# Patient Record
Sex: FEMALE | Race: ASIAN | Employment: UNEMPLOYED | URBAN - METROPOLITAN AREA
[De-identification: names, ages, dates, MRNs, and addresses within clinical notes are randomized per-mention and may not be internally consistent; named-entity substitution may affect disease eponyms.]

---

## 2024-05-11 ENCOUNTER — HOSPITAL ENCOUNTER (EMERGENCY)
Facility: HOSPITAL | Age: 44
Discharge: HOME | End: 2024-05-11
Attending: EMERGENCY MEDICINE

## 2024-05-11 ENCOUNTER — APPOINTMENT (OUTPATIENT)
Dept: ULTRASOUND IMAGING | Facility: HOSPITAL | Age: 44
End: 2024-05-11
Attending: EMERGENCY MEDICINE

## 2024-05-11 VITALS
BODY MASS INDEX: 21.09 KG/M2 | WEIGHT: 119.06 LBS | SYSTOLIC BLOOD PRESSURE: 107 MMHG | RESPIRATION RATE: 18 BRPM | HEART RATE: 67 BPM | HEIGHT: 62.99 IN | OXYGEN SATURATION: 100 % | TEMPERATURE: 99 F | DIASTOLIC BLOOD PRESSURE: 72 MMHG

## 2024-05-11 DIAGNOSIS — O02.0 MOLAR PREGNANCY (HCC): Primary | ICD-10-CM

## 2024-05-11 LAB
B-HCG SERPL-ACNC: ABNORMAL MIU/ML
BILIRUB UR QL STRIP.AUTO: NEGATIVE
CLARITY UR REFRACT.AUTO: CLEAR
COLOR UR AUTO: COLORLESS
ERYTHROCYTE [DISTWIDTH] IN BLOOD BY AUTOMATED COUNT: 12 %
GLUCOSE UR STRIP.AUTO-MCNC: NORMAL MG/DL
HCT VFR BLD AUTO: 38.5 %
HGB BLD-MCNC: 12.9 G/DL
KETONES UR STRIP.AUTO-MCNC: NEGATIVE MG/DL
LEUKOCYTE ESTERASE UR QL STRIP.AUTO: NEGATIVE
MCH RBC QN AUTO: 30.9 PG (ref 26–34)
MCHC RBC AUTO-ENTMCNC: 33.5 G/DL (ref 31–37)
MCV RBC AUTO: 92.3 FL
NITRITE UR QL STRIP.AUTO: NEGATIVE
PH UR STRIP.AUTO: 5.5 [PH] (ref 5–8)
PLATELET # BLD AUTO: 211 10(3)UL (ref 150–450)
PROT UR STRIP.AUTO-MCNC: NEGATIVE MG/DL
RBC # BLD AUTO: 4.17 X10(6)UL
RBC UR QL AUTO: NEGATIVE
RH BLOOD TYPE: POSITIVE
SP GR UR STRIP.AUTO: 1.01 (ref 1–1.03)
UROBILINOGEN UR STRIP.AUTO-MCNC: NORMAL MG/DL
WBC # BLD AUTO: 6 X10(3) UL (ref 4–11)

## 2024-05-11 PROCEDURE — 99284 EMERGENCY DEPT VISIT MOD MDM: CPT

## 2024-05-11 PROCEDURE — 85027 COMPLETE CBC AUTOMATED: CPT | Performed by: EMERGENCY MEDICINE

## 2024-05-11 PROCEDURE — 76817 TRANSVAGINAL US OBSTETRIC: CPT | Performed by: EMERGENCY MEDICINE

## 2024-05-11 PROCEDURE — 84702 CHORIONIC GONADOTROPIN TEST: CPT | Performed by: EMERGENCY MEDICINE

## 2024-05-11 PROCEDURE — 81003 URINALYSIS AUTO W/O SCOPE: CPT | Performed by: EMERGENCY MEDICINE

## 2024-05-11 PROCEDURE — 36415 COLL VENOUS BLD VENIPUNCTURE: CPT

## 2024-05-11 PROCEDURE — 81025 URINE PREGNANCY TEST: CPT

## 2024-05-11 PROCEDURE — 99285 EMERGENCY DEPT VISIT HI MDM: CPT

## 2024-05-11 PROCEDURE — 76801 OB US < 14 WKS SINGLE FETUS: CPT | Performed by: EMERGENCY MEDICINE

## 2024-05-11 PROCEDURE — 86901 BLOOD TYPING SEROLOGIC RH(D): CPT | Performed by: EMERGENCY MEDICINE

## 2024-05-11 PROCEDURE — 86900 BLOOD TYPING SEROLOGIC ABO: CPT | Performed by: EMERGENCY MEDICINE

## 2024-05-11 NOTE — ED QUICK NOTES
Pt remains resting on cart with family at bs - denies pain/vaginal dischg. Pt received urine cup for spec after US completed.

## 2024-05-11 NOTE — ED PROVIDER NOTES
Patient Seen in: [facility] Emergency Department      History     Chief Complaint   Patient presents with    Pregnancy Issues     Stated Complaint: sent from [facility] for US, LMP 3/22/24, 7 weeks pregnant    Subjective:   HPI    43-year-old female  6 para 4-0-1-4 last menstrual period 3/22/2024 estimated gestational age of 7 weeks presents to the emergency department from a clinic in [place] for evaluation of pregnancy status to rule out ectopic.  The patient denies any abdominal pain or vaginal bleeding.  She has been nauseated consistent with prior pregnancies.  An ultrasound today reportedly could not locate the pregnancy in the uterus.    Objective:   History reviewed. No pertinent past medical history.           History reviewed. No pertinent surgical history.             Social History     Socioeconomic History    Marital status:    Tobacco Use    Smoking status: Never    Smokeless tobacco: Never   Vaping Use    Vaping status: Never Used   Substance and Sexual Activity    Alcohol use: Never    Drug use: Never              Review of Systems    Positive for stated complaint: sent from [facility] for US, LMP 3/22/24, 7 weeks pregnant  Other systems are as noted in HPI.  Constitutional and vital signs reviewed.      All other systems reviewed and negative except as noted above.    Physical Exam     ED Triage Vitals [24 1804]   /75   Pulse 81   Resp 18   Temp 98.6 °F (37 °C)   Temp src    SpO2 99 %   O2 Device None (Room air)       Current Vitals:   Vital Signs  BP: 111/75  Pulse: 81  Resp: 18  Temp: 98.6 °F (37 °C)    Oxygen Therapy  SpO2: 99 %  O2 Device: None (Room air)            Physical Exam    General appearance: This is a middle-aged female lying on a gurney.  Vital signs were reviewed per nurses notes.  Heart rate and blood pressure within normal range.  HEENT: Normocephalic atraumatic.  Anicteric sclera.  Oral mucosa is moist.  Oropharynx is normal.  Neck: No  adenopathy or thyromegaly.  Lungs are clear to auscultation.  Heart exam: Normal S1-S2 without extra sounds or murmurs.  Regular rate and rhythm.  Abdomen is soft and nontender without masses or rebound.  Extremities are atraumatic.  Neuroexam: Awake, conversive and moving all 4 extremities well.    ED Course     Labs Reviewed   HCG, BETA SUBUNIT (QUANT PREGNANCY TEST) - Abnormal; Notable for the following components:       Result Value    Hcg Quantitative 163,453.0 (*)     All other components within normal limits   URINALYSIS, ROUTINE - Abnormal; Notable for the following components:    Urine Color Colorless (*)     All other components within normal limits   CBC, PLATELET; NO DIFFERENTIAL   ABORH (BLOOD TYPE)             Intravenous access was obtained.  Laboratory studies were drawn.  US PREG 1ST TRIM W/EV (CPT=76801/12369)    Result Date: 5/11/2024  PROCEDURE:  US OB W/ EV 1ST TRIMESTER (CPT=76801/06288)  COMPARISON:  None.  INDICATIONS:  sent from [facility] for US, LMP 3/22/24, 7 weeks pregnant  TECHNIQUE:  Transabdominal and endovaginal pelvic ultrasound examinations were performed.  PATIENT STATED HISTORY: (As transcribed by Technologist)     MEASUREMENTS:  Gestational Age:               6 weeks and 6 days Fetal Heart Rate:               Not detected [place] Rump Length:       Not detected Left Ovary:                       3.43 cm x 3.84 cm x 3.61 cm Right Ovary:                     2.31 cm x 3.35 cm x 1.67 cm    FINDINGS:  GESTATIONAL SAC:  The gestational sac is abnormal in appearance measuring up to 1.9 x 0.9 x 3.1 cm.. FETAL POLE:  Not identified. YOLK SAC:  Not identified. CARDIAC ACTIVITY:  Not identified. UTERUS:  The endometrium is markedly heterogeneous with questionable cystic spaces.  These findings may be seen in the setting of a molar pregnancy.  Clinical correlation is advised. PLACENTA:  Normal  RIGHT OVARY:  Normal. LEFT OVARY:  Simple cyst in the left ovary measures 3.2 x 3.2 x 2.5 cm.  CUL-DE-SAC:  Small amount of free fluid is noted in the cul-de-sac. CLINICAL AGE:  Normal SONOGRAPHIC AGE:  Normal OTHER:  Negative.            CONCLUSION:   1. Heterogeneous endometrium with questionable cystic spaces may be seen in the setting of a molar pregnancy.  Clinical correlation is advised. 2. Abnormal appearing gestational sac in the endometrial canal is noted.  No fetal pole is identified.     LOCATION:  [facility]   Dictated by (CST): Moy Fajardo MD on 5/11/2024 at 9:04 PM     Finalized by (CST): Moy Fajardo MD on 5/11/2024 at 9:09 PM       I personally reviewed the images myself and went over results with patient.    I viewed the pregnancy ultrasound films myself and there is no fetal pole seen despite a high hCG of 163,000.  Findings are consistent with molar pregnancy.  Results discussed with the radiologist.    Test results and treatment plan were discussed with the patient and her .  Contact with gynecologist on-call Dr. Waggoner was made and her office will be contacting the patient on Monday for outpatient follow-up and D&C.  Criteria for return to the emergency department was outlined.         MDM      #1.  Molar pregnancy.  Follow-up arranged with OB Dr. Waggoner.  Patient and  educated regarding signs and symptoms that require return to the emergency department more urgently.                                   Medical Decision Making      Disposition and Plan     Clinical Impression:  1. Molar pregnancy (HCC)         Disposition:  Discharge  5/11/2024  9:26 pm    Follow-up:  King Waggoner MD  [address] [place] [address]  [phone]    Call in 2 day(s)            Medications Prescribed:  There are no discharge medications for this patient.

## 2024-05-11 NOTE — ED INITIAL ASSESSMENT (HPI)
Pt comes in referred from women[facility] center for positive preg, unable to visualize fetus on US. Sent fr r/o ectopic. Denies pain or spotting.

## 2024-05-12 NOTE — DISCHARGE INSTRUCTIONS
Return to the emergency department for any pain significant abdominal pain or vaginal bleeding more than a pad an hour for a few hours in duration.    Someone from the obstetrician's office should be reaching out to you by phone on Monday morning.  If you do not hear from them by midmorning on Monday, call the office.  You need to be seen so that a timely D&C may be scheduled.

## 2024-05-13 ENCOUNTER — TELEPHONE (OUTPATIENT)
Dept: OBGYN CLINIC | Facility: CLINIC | Age: 44
End: 2024-05-13

## 2024-05-13 LAB — B-HCG UR QL: POSITIVE

## 2024-05-13 NOTE — TELEPHONE ENCOUNTER
Pt scheduled for us   Future Appointments   Date Time Provider Department Center   5/21/2024  1:15 PM [facility] US [facility] [place]   5/21/2024  2:00 PM Bridgette Gauthier MD [facility] [place]

## 2024-05-13 NOTE — TELEPHONE ENCOUNTER
----- Message from King Waggoner sent at 5/12/2024 11:21 AM CDT -----  Hello,     This pt needs to be scheduled in office in the next 1-2 weeks for a possible molar pregnancy.

## 2024-05-13 NOTE — TELEPHONE ENCOUNTER
Spoke to patient's spouse to schedule US and office visit for 05/21/2024 in our office.     Per patient's spouse, patient has not had any vaginal bleeding just reports some discomfort after the US. Pain and bleeding precautions discussed. Aware of when to go to the ED.

## 2024-05-21 ENCOUNTER — TELEPHONE (OUTPATIENT)
Dept: OBGYN UNIT | Facility: HOSPITAL | Age: 44
End: 2024-05-21

## 2024-05-21 ENCOUNTER — TELEPHONE (OUTPATIENT)
Dept: OBGYN CLINIC | Facility: CLINIC | Age: 44
End: 2024-05-21

## 2024-05-21 ENCOUNTER — ANESTHESIA EVENT (OUTPATIENT)
Dept: SURGERY | Facility: HOSPITAL | Age: 44
End: 2024-05-21

## 2024-05-21 ENCOUNTER — OFFICE VISIT (OUTPATIENT)
Dept: OBGYN CLINIC | Facility: CLINIC | Age: 44
End: 2024-05-21
Payer: COMMERCIAL

## 2024-05-21 ENCOUNTER — ULTRASOUND ENCOUNTER (OUTPATIENT)
Dept: OBGYN CLINIC | Facility: CLINIC | Age: 44
End: 2024-05-21
Payer: COMMERCIAL

## 2024-05-21 VITALS
SYSTOLIC BLOOD PRESSURE: 112 MMHG | BODY MASS INDEX: 22 KG/M2 | DIASTOLIC BLOOD PRESSURE: 70 MMHG | WEIGHT: 125.19 LBS | HEART RATE: 92 BPM

## 2024-05-21 DIAGNOSIS — O02.0 MOLAR PREGNANCY (HCC): Primary | ICD-10-CM

## 2024-05-21 DIAGNOSIS — O02.0: Primary | ICD-10-CM

## 2024-05-21 PROCEDURE — 76830 TRANSVAGINAL US NON-OB: CPT | Performed by: OBSTETRICS & GYNECOLOGY

## 2024-05-21 PROCEDURE — 99204 OFFICE O/P NEW MOD 45 MIN: CPT | Performed by: OBSTETRICS & GYNECOLOGY

## 2024-05-21 NOTE — PROGRESS NOTES
[facility]  Obstetrics and Gynecology    Subjective:     Lillie Miller is a 43 year old  who presents for evaluation of suspected molar pregnancy. Pt was seen initially at a women's center in [place] due to pregnancy of unknown location on ultrasound. She was sent to ED on  where elevated hcg level and US findings have led to suspicion of molar pregnancy. Pt reports feeling very nauseated, but not having much pain and no bleeding. She and  have done research on molar pregnancy and already familiar with the diagnosis, ready to proceed with treatment.    Patient's last menstrual period was 2024 (exact date).    OB History    Para Term  AB Living   6 4 4 0 1 4   SAB IAB Ectopic Multiple Live Births   1 0 0 0 4      # Outcome Date GA Lbr Norm/2nd Weight Sex Type Anes PTL Lv   6 Current            5 Term 21 37w1d 15:17 / 00:22 6 lb 10.4 oz (3.015 kg) M Vag-Spont EPI N CINDI   4 Term 19 39w1d 03:29 / 00:09 7 lb 6.9 oz (3.37 kg) F Vag-Spont EPI  CINDI   3 Term 17 39w0d 00:57 / 00:01 7 lb 10.6 oz (3.475 kg) F Vag-Spont None  CINDI      Birth Comments: Precipitous, delivered within minutes of arrival to [facility]   2 Term 14 37w1d  7 lb 7 oz (3.374 kg) M  EPI  CINDI   1 SAB 13 5w0d             Birth Comments: no medication or surgey.       Period Cycle (Days): Pregnant (2024  1:53 PM)  Period Duration (Days): N/A (2024  1:53 PM)  Period Flow: N/A (2024  1:53 PM)  Hx Prior Abnormal Pap: No (2024  1:53 PM)  Pap Date:  (per pt ) (2024  1:53 PM)  Pap Result Notes: WNL (2024  1:53 PM)     History reviewed. No pertinent past medical history.     Past Surgical History:   Procedure Laterality Date    Lasik        No current facility-administered medications for this visit.      No Known Allergies       Objective:     Vitals:    24 1420   BP: 112/70   Pulse: 92   Weight: 125 lb 3.2 oz (56.8 kg)       Body mass index is 22.18  kg/m².    GEN: AAOx3, NAD, appears well, appears stated age  RESP: breathing comfortably      Labs:   Latest Reference Range & Units 24 18:12   HCG QUANTITATIVE <=3.0 mIU/mL 163,453.0 (H)       Imaging:    US 2024: Uterus is anteverted, measures 13.1 x 11.6 x 9.9 cm. Endometrium appears thickened, cavity filled with cystic appearing tissue, no increased color flow. No evidence of intrauterine pregnancy.     Ovaries are normal in size and no abnormalities noted.  No free fluid in the pelvis.       Pelvic US 24  FINDINGS:    GESTATIONAL SAC:  The gestational sac is abnormal in appearance measuring up to 1.9 x 0.9 x 3.1 cm..  FETAL POLE:  Not identified.  YOLK SAC:  Not identified.  CARDIAC ACTIVITY:  Not identified.  UTERUS:  The endometrium is markedly heterogeneous with questionable cystic spaces.  These findings may be seen in the setting of a molar pregnancy.  Clinical correlation is advised.  PLACENTA:  Normal     RIGHT OVARY:  Normal.  LEFT OVARY:  Simple cyst in the left ovary measures 3.2 x 3.2 x 2.5 cm.  CUL-DE-SAC:  Small amount of free fluid is noted in the cul-de-sac.  CLINICAL AGE:  Normal  SONOGRAPHIC AGE:  Normal        Impression   CONCLUSION:       1. Heterogeneous endometrium with questionable cystic spaces may be seen in the setting of a molar pregnancy.  Clinical correlation is advised.  2. Abnormal appearing gestational sac in the endometrial canal is noted.  No fetal pole is identified.         Assessment:     Lillie Miller is a 43 year old  with suspected complete molar pregnancy.      Plan:     Counseled patient regarding suspected molar pregnancy (gestational trophoblastic disease). Counseled on significance of the diagnosis and the findings that are leading to our suspicion of this diagnosis. Recommend uterine evacuation with suction D&C and diagnosis will be confirmed from histopathologic review.     Counseled regarding risk of bleeding during D&C. Will have uterotonic  medications available. Advised on possibility of needing blood transfusion in the case of emergency, which she is  willing to accept.    Will need to trend hcg weekly until negative and then monthly for 3 months to monitor for gestational trophoblastic neoplasia.  Will need to avoid conception of another pregnancy during this time. Does not desire future pregnancy.    -- Scheduled for suction D&C tomorrow.  Risks, benefits and alternatives were discussed with patient including but not limited to risk of infection, injury to surrounding organs, bleeding, blood transfusion and very low risk of emergent exploratory laparotomy and/or hysterectomy.    Bridgette Will MD  [facility]  5/21/2024 2:26 PM

## 2024-05-21 NOTE — TELEPHONE ENCOUNTER
----- Message from Bridgette Will sent at 5/21/2024  2:35 PM CDT -----  Regarding: surgery  Surgeon: Dr. Bridgette Will MD  Assistant: No     Type of Admit: Outpatient Surgery  Procedure Location: Main OR    PreOp Dx: suspected molar pregnancy    Procedure: dilatation and curettage with suction    Anesthesia: General per anesthesia    Special Equipment/Comments: ultrasound at bedside    Antibiotics: 200mg doxycycline PO pre-op. No penicillin or cephalosporin allergy.     Pre Op Orders: initiate my Pre-op standing orders, if none exist please use [facility]'s Standing Order     Labs: Type and screen, H&H, quant beta hcg    Medical clearance: No

## 2024-05-21 NOTE — TELEPHONE ENCOUNTER
Surgery scheduled for 5/22/24 at 1030  Post op   Future Appointments   Date Time Provider Department Center   5/22/2024 10:30 AM [facility] US INTRAOPERATIVE [facility]   6/3/2024 10:45 AM Chari Almanzar MD [facility] P [facility] [address]       Orders entered  Added to calendar  PA - no auth needed - per website  Procedure Code 1  79449    Quantity  1 Visits    Procedure From - To Date  2024-05-21    Status  NO AUTH REQUIRED

## 2024-05-22 ENCOUNTER — APPOINTMENT (OUTPATIENT)
Dept: ULTRASOUND IMAGING | Facility: HOSPITAL | Age: 44
End: 2024-05-22
Attending: OBSTETRICS & GYNECOLOGY

## 2024-05-22 ENCOUNTER — HOSPITAL ENCOUNTER (OUTPATIENT)
Facility: HOSPITAL | Age: 44
Setting detail: HOSPITAL OUTPATIENT SURGERY
Discharge: HOME | End: 2024-05-22
Attending: OBSTETRICS & GYNECOLOGY | Admitting: OBSTETRICS & GYNECOLOGY

## 2024-05-22 ENCOUNTER — ANESTHESIA (OUTPATIENT)
Dept: SURGERY | Facility: HOSPITAL | Age: 44
End: 2024-05-22

## 2024-05-22 VITALS
BODY MASS INDEX: 22.15 KG/M2 | DIASTOLIC BLOOD PRESSURE: 50 MMHG | TEMPERATURE: 97 F | HEIGHT: 63 IN | HEART RATE: 74 BPM | RESPIRATION RATE: 16 BRPM | OXYGEN SATURATION: 100 % | WEIGHT: 125 LBS | SYSTOLIC BLOOD PRESSURE: 110 MMHG

## 2024-05-22 DIAGNOSIS — O02.0 MOLAR PREGNANCY (HCC): ICD-10-CM

## 2024-05-22 DIAGNOSIS — Z98.890 S/P D&C (STATUS POST DILATION AND CURETTAGE): Primary | ICD-10-CM

## 2024-05-22 PROBLEM — O09.529 MULTIGRAVIDA OF ADVANCED MATERNAL AGE (HCC): Status: ACTIVE | Noted: 2021-02-17

## 2024-05-22 LAB
BASOPHILS # BLD AUTO: 0.01 X10(3) UL (ref 0–0.2)
BASOPHILS NFR BLD AUTO: 0.2 %
EOSINOPHIL # BLD AUTO: 0.01 X10(3) UL (ref 0–0.7)
EOSINOPHIL NFR BLD AUTO: 0.2 %
ERYTHROCYTE [DISTWIDTH] IN BLOOD BY AUTOMATED COUNT: 11.8 %
HCT VFR BLD AUTO: 34 %
HGB BLD-MCNC: 11.5 G/DL
IMM GRANULOCYTES # BLD AUTO: 0.01 X10(3) UL (ref 0–1)
IMM GRANULOCYTES NFR BLD: 0.2 %
LYMPHOCYTES # BLD AUTO: 1.12 X10(3) UL (ref 1–4)
LYMPHOCYTES NFR BLD AUTO: 20 %
MCH RBC QN AUTO: 31.3 PG (ref 26–34)
MCHC RBC AUTO-ENTMCNC: 33.8 G/DL (ref 31–37)
MCV RBC AUTO: 92.4 FL
MONOCYTES # BLD AUTO: 0.48 X10(3) UL (ref 0.1–1)
MONOCYTES NFR BLD AUTO: 8.6 %
NEUTROPHILS # BLD AUTO: 3.98 X10 (3) UL (ref 1.5–7.7)
NEUTROPHILS # BLD AUTO: 3.98 X10(3) UL (ref 1.5–7.7)
NEUTROPHILS NFR BLD AUTO: 70.8 %
PLATELET # BLD AUTO: 163 10(3)UL (ref 150–450)
RBC # BLD AUTO: 3.68 X10(6)UL
WBC # BLD AUTO: 5.6 X10(3) UL (ref 4–11)

## 2024-05-22 PROCEDURE — 59812 TREATMENT OF MISCARRIAGE: CPT | Performed by: OBSTETRICS & GYNECOLOGY

## 2024-05-22 RX ORDER — ONDANSETRON 2 MG/ML
INJECTION INTRAMUSCULAR; INTRAVENOUS AS NEEDED
Status: DISCONTINUED | OUTPATIENT
Start: 2024-05-22 | End: 2024-05-22 | Stop reason: SURG

## 2024-05-22 RX ORDER — DEXAMETHASONE SODIUM PHOSPHATE 4 MG/ML
VIAL (ML) INJECTION AS NEEDED
Status: DISCONTINUED | OUTPATIENT
Start: 2024-05-22 | End: 2024-05-22 | Stop reason: SURG

## 2024-05-22 RX ORDER — HYDROMORPHONE HYDROCHLORIDE 1 MG/ML
0.4 INJECTION, SOLUTION INTRAMUSCULAR; INTRAVENOUS; SUBCUTANEOUS EVERY 5 MIN PRN
Status: DISCONTINUED | OUTPATIENT
Start: 2024-05-22 | End: 2024-05-22

## 2024-05-22 RX ORDER — SODIUM CHLORIDE, SODIUM LACTATE, POTASSIUM CHLORIDE, CALCIUM CHLORIDE 600; 310; 30; 20 MG/100ML; MG/100ML; MG/100ML; MG/100ML
INJECTION, SOLUTION INTRAVENOUS CONTINUOUS
Status: DISCONTINUED | OUTPATIENT
Start: 2024-05-22 | End: 2024-05-22

## 2024-05-22 RX ORDER — METOCLOPRAMIDE HYDROCHLORIDE 5 MG/ML
INJECTION INTRAMUSCULAR; INTRAVENOUS AS NEEDED
Status: DISCONTINUED | OUTPATIENT
Start: 2024-05-22 | End: 2024-05-22 | Stop reason: SURG

## 2024-05-22 RX ORDER — PROCHLORPERAZINE EDISYLATE 5 MG/ML
5 INJECTION INTRAMUSCULAR; INTRAVENOUS EVERY 8 HOURS PRN
Status: DISCONTINUED | OUTPATIENT
Start: 2024-05-22 | End: 2024-05-22

## 2024-05-22 RX ORDER — HYDROMORPHONE HYDROCHLORIDE 1 MG/ML
0.2 INJECTION, SOLUTION INTRAMUSCULAR; INTRAVENOUS; SUBCUTANEOUS EVERY 5 MIN PRN
Status: DISCONTINUED | OUTPATIENT
Start: 2024-05-22 | End: 2024-05-22

## 2024-05-22 RX ORDER — DOXYCYCLINE HYCLATE 100 MG/1
200 CAPSULE ORAL ONCE
Status: COMPLETED | OUTPATIENT
Start: 2024-05-22 | End: 2024-05-22

## 2024-05-22 RX ORDER — IBUPROFEN 600 MG/1
600 TABLET ORAL EVERY 6 HOURS PRN
Qty: 30 TABLET | Refills: 0 | Status: SHIPPED | OUTPATIENT
Start: 2024-05-22 | End: 2024-06-03

## 2024-05-22 RX ORDER — HYDROCODONE BITARTRATE AND ACETAMINOPHEN 5; 325 MG/1; MG/1
2 TABLET ORAL ONCE AS NEEDED
Status: DISCONTINUED | OUTPATIENT
Start: 2024-05-22 | End: 2024-05-22

## 2024-05-22 RX ORDER — KETOROLAC TROMETHAMINE 30 MG/ML
INJECTION, SOLUTION INTRAMUSCULAR; INTRAVENOUS AS NEEDED
Status: DISCONTINUED | OUTPATIENT
Start: 2024-05-22 | End: 2024-05-22 | Stop reason: SURG

## 2024-05-22 RX ORDER — SCOLOPAMINE TRANSDERMAL SYSTEM 1 MG/1
1 PATCH, EXTENDED RELEASE TRANSDERMAL ONCE
Status: DISCONTINUED | OUTPATIENT
Start: 2024-05-22 | End: 2024-05-22 | Stop reason: HOSPADM

## 2024-05-22 RX ORDER — ALBUTEROL SULFATE 2.5 MG/3ML
2.5 SOLUTION RESPIRATORY (INHALATION) AS NEEDED
Status: DISCONTINUED | OUTPATIENT
Start: 2024-05-22 | End: 2024-05-22

## 2024-05-22 RX ORDER — HYDROMORPHONE HYDROCHLORIDE 1 MG/ML
0.6 INJECTION, SOLUTION INTRAMUSCULAR; INTRAVENOUS; SUBCUTANEOUS EVERY 5 MIN PRN
Status: DISCONTINUED | OUTPATIENT
Start: 2024-05-22 | End: 2024-05-22

## 2024-05-22 RX ORDER — ACETAMINOPHEN 500 MG
1000 TABLET ORAL ONCE AS NEEDED
Status: DISCONTINUED | OUTPATIENT
Start: 2024-05-22 | End: 2024-05-22

## 2024-05-22 RX ORDER — HYDROCODONE BITARTRATE AND ACETAMINOPHEN 5; 325 MG/1; MG/1
1 TABLET ORAL ONCE AS NEEDED
Status: DISCONTINUED | OUTPATIENT
Start: 2024-05-22 | End: 2024-05-22

## 2024-05-22 RX ORDER — ONDANSETRON 4 MG/1
4 TABLET, ORALLY DISINTEGRATING ORAL EVERY 4 HOURS PRN
Qty: 20 TABLET | Refills: 0 | Status: SHIPPED | OUTPATIENT
Start: 2024-05-22

## 2024-05-22 RX ORDER — MIDAZOLAM HYDROCHLORIDE 1 MG/ML
INJECTION INTRAMUSCULAR; INTRAVENOUS AS NEEDED
Status: DISCONTINUED | OUTPATIENT
Start: 2024-05-22 | End: 2024-05-22 | Stop reason: SURG

## 2024-05-22 RX ORDER — MIDAZOLAM HYDROCHLORIDE 1 MG/ML
1 INJECTION INTRAMUSCULAR; INTRAVENOUS EVERY 5 MIN PRN
Status: DISCONTINUED | OUTPATIENT
Start: 2024-05-22 | End: 2024-05-22

## 2024-05-22 RX ORDER — METHYLERGONOVINE MALEATE 0.2 MG/ML
INJECTION INTRAVENOUS AS NEEDED
Status: DISCONTINUED | OUTPATIENT
Start: 2024-05-22 | End: 2024-05-22 | Stop reason: SURG

## 2024-05-22 RX ORDER — LABETALOL HYDROCHLORIDE 5 MG/ML
5 INJECTION, SOLUTION INTRAVENOUS EVERY 5 MIN PRN
Status: DISCONTINUED | OUTPATIENT
Start: 2024-05-22 | End: 2024-05-22

## 2024-05-22 RX ORDER — LIDOCAINE HYDROCHLORIDE 10 MG/ML
INJECTION, SOLUTION EPIDURAL; INFILTRATION; INTRACAUDAL; PERINEURAL AS NEEDED
Status: DISCONTINUED | OUTPATIENT
Start: 2024-05-22 | End: 2024-05-22 | Stop reason: SURG

## 2024-05-22 RX ORDER — ONDANSETRON 2 MG/ML
4 INJECTION INTRAMUSCULAR; INTRAVENOUS EVERY 6 HOURS PRN
Status: DISCONTINUED | OUTPATIENT
Start: 2024-05-22 | End: 2024-05-22

## 2024-05-22 RX ORDER — NALOXONE HYDROCHLORIDE 0.4 MG/ML
80 INJECTION, SOLUTION INTRAMUSCULAR; INTRAVENOUS; SUBCUTANEOUS AS NEEDED
Status: DISCONTINUED | OUTPATIENT
Start: 2024-05-22 | End: 2024-05-22

## 2024-05-22 RX ORDER — ACETAMINOPHEN 500 MG
1000 TABLET ORAL ONCE
Status: DISCONTINUED | OUTPATIENT
Start: 2024-05-22 | End: 2024-05-22 | Stop reason: HOSPADM

## 2024-05-22 RX ADMIN — METHYLERGONOVINE MALEATE 0.2 MG: 0.2 INJECTION INTRAVENOUS at 11:25:00

## 2024-05-22 RX ADMIN — MIDAZOLAM HYDROCHLORIDE 2 MG: 1 INJECTION INTRAMUSCULAR; INTRAVENOUS at 10:30:00

## 2024-05-22 RX ADMIN — DEXAMETHASONE SODIUM PHOSPHATE 4 MG: 4 MG/ML VIAL (ML) INJECTION at 10:45:00

## 2024-05-22 RX ADMIN — LIDOCAINE HYDROCHLORIDE 50 MG: 10 INJECTION, SOLUTION EPIDURAL; INFILTRATION; INTRACAUDAL; PERINEURAL at 10:40:00

## 2024-05-22 RX ADMIN — SODIUM CHLORIDE, SODIUM LACTATE, POTASSIUM CHLORIDE, CALCIUM CHLORIDE: 600; 310; 30; 20 INJECTION, SOLUTION INTRAVENOUS at 10:30:00

## 2024-05-22 RX ADMIN — METOCLOPRAMIDE HYDROCHLORIDE 10 MG: 5 INJECTION INTRAMUSCULAR; INTRAVENOUS at 10:45:00

## 2024-05-22 RX ADMIN — KETOROLAC TROMETHAMINE 30 MG: 30 INJECTION, SOLUTION INTRAMUSCULAR; INTRAVENOUS at 11:25:00

## 2024-05-22 RX ADMIN — ONDANSETRON 4 MG: 2 INJECTION INTRAMUSCULAR; INTRAVENOUS at 10:35:00

## 2024-05-22 NOTE — ANESTHESIA POSTPROCEDURE EVALUATION
[facility]    Lillie Miller Patient Status:  Hospital Outpatient Surgery   Age/Gender 43 year old female MRN [id number]   Location [facility] PERIOPERATIVE SERVICE Attending Bridgette Gauthier MD   Hosp Day # 0 PCP No primary care provider on file.       Anesthesia Post-op Note    DILATION AND CURETTAGE SUCTION    Procedure Summary       Date: 05/22/24 Room / Location:  MAIN OR 02 / [facility] MAIN OR    Anesthesia Start: 1030 Anesthesia Stop: 1152    Procedure: DILATION AND CURETTAGE SUCTION (Uterus) Diagnosis:       Molar pregnancy (HCC)      (Molar pregnancy (HCC) [O02.0])    Surgeons: Bridgette Gauthier MD Anesthesiologist: Frank Norton MD    Anesthesia Type: MAC ASA Status: 2            Anesthesia Type: MAC    Vitals Value Taken Time   /60 05/22/24 1153   Temp 97.1 °F (36.2 °C) 05/22/24 1153   Pulse 69 05/22/24 1153   Resp 16 05/22/24 1153   SpO2 100 % 05/22/24 1153   Vitals shown include unfiled device data.    Patient Location: Same Day Surgery    Anesthesia Type: MAC    Airway Patency: patent    Postop Pain Control: adequate    Mental Status: mildly sedated but able to meaningfully participate in the post-anesthesia evaluation    Nausea/Vomiting: none    Cardiopulmonary/Hydration status: stable euvolemic    Complications: no apparent anesthesia related complications    Postop vital signs: stable    Dental Exam: Unchanged from Preop    Patient to be discharged home when criteria met.

## 2024-05-22 NOTE — H&P
[facility]  History & Physical    Lillie Miller Patient Status:  Hospital Outpatient Surgery    1980 MRN [id number]   Location [facility] PERIOPERATIVE Attending Bridgette Gauthier MD   Hosp Day # 0 PCP No primary care provider on file.     SUBJECTIVE:    Reason for Admission: suspected molar pregnancy    History of Present Illness:  Patient is a(n) 43 year old  admitted for D&C due to molar pregnancy. Patient reports some spotting overnight, a little more abdominal cramping, and still nauseated.     Medications:  No medications prior to admission.       Allergies:  No Known Allergies     Past Medical History:  History reviewed. No pertinent past medical history.    Past Surgical History:  Past Surgical History:   Procedure Laterality Date    Lasik         Past OB History:  OB History    Para Term  AB Living   6 4 4 0 1 4   SAB IAB Ectopic Multiple Live Births   1 0 0 0 4      # Outcome Date GA Lbr Norm/2nd Weight Sex Type Anes PTL Lv   6 Current            5 Term 21 37w1d 15:17 / 00:22 6 lb 10.4 oz (3.015 kg) M Vag-Spont EPI N CINDI   4 Term 19 39w1d 03:29 / 00:09 7 lb 6.9 oz (3.37 kg) F Vag-Spont EPI  CINDI   3 Term 17 39w0d 00:57 / 00:01 7 lb 10.6 oz (3.475 kg) F Vag-Spont None  CINDI      Birth Comments: Precipitous, delivered within minutes of arrival to [facility]   2 Term 14 37w1d  7 lb 7 oz (3.374 kg) M  EPI  CINDI   1 SAB 13 5w0d             Birth Comments: no medication or surgey.       Past GYN History:   Period Cycle (Days): Pregnant (2024  1:53 PM)  Period Duration (Days): N/A (2024  1:53 PM)  Period Flow: N/A (2024  1:53 PM)  Hx Prior Abnormal Pap: No (2024  1:53 PM)  Pap Date:  (per pt ) (2024  1:53 PM)  Pap Result Notes: WNL (2024  1:53 PM)       Social History:  Social History     Tobacco Use    Smoking status: Never    Smokeless tobacco: Never   Substance Use Topics    Alcohol use: Not Currently        Review of  Systems:  Non-contributory    OBJECTIVE:    Temp:  [97.6 °F (36.4 °C)] 97.6 °F (36.4 °C)  Pulse:  [74] 74  Resp:  [16] 16  BP: (110)/(58) 110/58  SpO2:  [100 %] 100 %  No intake or output data in the 24 hours ending 05/22/24 0913    Physical Exam:  General: Alert, orientated x3, NAD  HEENT: Exam is unremarkable. NC/AT. Mucous membranes are moist.   Lungs: Clear to auscultation bilaterally.  Cardiac: Regular rate and rhythm. No murmur.  Abdomen:  Soft, non-distended, non-tender. Benign without masses or peritoneal signs.   Pelvic: deferred to OR  Extremities:  No c/c/e or tenderness    Skin: Normal texture/turgor       Diagnostics:    Transvaginal ultrasound from 5/21/2024 reviewed: Uterus is anteverted, measures 13.1 x 11.6 x 9.9 cm. Endometrium appears thickened, cavity filled with cystic appearing tissue, no increased color flow. No evidence of intrauterine pregnancy. Ovaries are normal in size and no abnormalities noted. No free fluid in the pelvis. Impression: Findings suspicious for complete molar pregnancy.     US PREG 1ST TRIM W/EV (CPT=76801/77990)  Result Date: 5/11/2024     MEASUREMENTS:  Gestational Age:               6 weeks and 6 days Fetal Heart Rate:               Not detected [place] Rump Length:       Not detected Left Ovary:                       3.43 cm x 3.84 cm x 3.61 cm Right Ovary:                     2.31 cm x 3.35 cm x 1.67 cm    FINDINGS:  GESTATIONAL SAC:  The gestational sac is abnormal in appearance measuring up to 1.9 x 0.9 x 3.1 cm.. FETAL POLE:  Not identified. YOLK SAC:  Not identified. CARDIAC ACTIVITY:  Not identified. UTERUS:  The endometrium is markedly heterogeneous with questionable cystic spaces.  These findings may be seen in the setting of a molar pregnancy.  Clinical correlation is advised. PLACENTA:  Normal  RIGHT OVARY:  Normal. LEFT OVARY:  Simple cyst in the left ovary measures 3.2 x 3.2 x 2.5 cm. CUL-DE-SAC:  Small amount of free fluid is noted in the cul-de-sac.  CLINICAL AGE:  Normal SONOGRAPHIC AGE:  Normal OTHER:  Negative.            CONCLUSION:   1. Heterogeneous endometrium with questionable cystic spaces may be seen in the setting of a molar pregnancy.  Clinical correlation is advised. 2. Abnormal appearing gestational sac in the endometrial canal is noted.  No fetal pole is identified.           Data Review:   Labs from today pending.  Hematology:   Lab Results   Component Value Date    WBC 6.0 05/11/2024    RBC 4.17 05/11/2024    HGB 12.9 05/11/2024    HCT 38.5 05/11/2024    .0 05/11/2024      Latest Reference Range & Units 05/11/24 18:12   HCG QUANTITATIVE <=3.0 mIU/mL 163,453.0 (H)       ASSESSMENT:  Present on Admission:   Molar pregnancy (HCC)        PLAN:    To OR for US guided suction D&C.  200mg Doxycycline.  TXA and uterotonics available.   Plan for dc home from PACU.    All of the findings and plan were discussed with the patient.  She notes understanding and agrees with the plan of care. She understands the risks and complications of the procedure; including but not limited to bleeding, infection, trauma to GI and  tracts.   All questions were answered.    Bridgette Will MD  [facility]  5/22/2024 9:11 AM

## 2024-05-22 NOTE — OPERATIVE REPORT
[facility]  Operative Note    Lillie Miller Patient Status:  Hospital Outpatient Surgery    1980 MRN [id number]   [facility] PERIOPERATIVE SERVICE Attending Bridgette Gauthier MD   Hosp Day # 0 PCP No primary care provider on file.     Pre-Operative Diagnosis: Molar pregnancy (HCC) [O02.0]     Post-Operative Diagnosis: Molar pregnancy (HCC) [O02.0]      Procedure Performed:   DILATION AND CURETTAGE SUCTION    Surgeons and Role:     * Bridgette Gauthier MD - Primary    Assistant(s):   None     Surgical Findings: copious endometrial contents, appropriate amount of uterine bleeding     Specimen:   ID Type Source Tests Collected by Time Destination   1 : Products of Conception Tissue Products of conception SURGICAL PATHOLOGY TISSUE Bridgette Gauthier MD 2024 11:05 AM    A :  Tissue Products of conception CHROMOSOME ANALYSIS, POC WITH REFLEX TO GENOMIC MICROARRAY Bridgette Gauthier MD 2024 11:04 AM          Estimated Blood Loss: 550cc      Procedure:   The patient was taken to the operative suite after appropriate counseling and consent. She was placed under general anesthesia, positioned in the dorsal lithotomy position, prepped and draped in a sterile fashion.   Her bladder emptied and she was examined. Weighted speculum was introduced into the vagina. Right-angle retractor was used to aid in visualization of the cervix, which was grasped with a Allis clamp at the 12 o'clock position. The uterus was sounded to 15cm. Next, the cervix was systematically dilated to allow a 8 mm curved plastic suction curette.  The curette was introduced into the uterine cavity after pressures were checked. The products were gently vacuumed from the uterine cavity. The three last passes of the suction did not have any evident tissue. The final suctioning confirmed excellent hemostasis. The uterus was now sounded to 12cm. Good uterine tone. 0.2mg Methergine IM administered prophylactically.   Specimen was sent to  pathology, including chromosome analysis. The instruments were removed from the vagina. Counts were correct following the procedure. Patient was moved to the supine position, awakened and transferred to the recovery room in stable condition.    Complications: None  Dispo: discharge home    Bridgette Will MD  [facility]  5/22/2024 11:58 AM

## 2024-05-22 NOTE — DISCHARGE INSTRUCTIONS
Discharge Instructions for Dilation and Curettage (D and C)   You had a dilation and curettage (D&C). The reasons for having this procedure vary. It may be done to control heavy uterine bleeding or to find the cause of irregular bleeding. It may also be done to remove pregnancy tissue after an  or miscarriage.   Home care  Take it easy. Rest for 2 days as needed.  Go back to your normal activities after 24 to 48 hours. You may also return to work at that time.  Eat a normal diet.  Take an over-the-counter pain reliever if needed.  It’s OK to have bleeding for about a week after the D&C. The amount of bleeding should be similar to what you have during a normal period.  Don’t drive for 24 hours after the D&C unless your provider says it's OK.  Don’t have sex or use tampons or douches until your healthcare provider says it’s safe.    Follow-up  Make a follow-up appointment, or as advised.    When to call your healthcare provider  Call your healthcare provider right away if you have any of these:   Bleeding that soaks more than 1 sanitary pad in 1 hour  Severe belly pain  Severe cramps  Fever of 100.4°F ( 38.0°C) or higher, or as directed by your provider  A bad-smelling vaginal discharge  [employer] last reviewed this educational content on 2022 [employer]. All rights reserved. This information is not intended as a substitute for professional medical care. Always follow your healthcare professional's instructions.

## 2024-05-22 NOTE — ANESTHESIA PREPROCEDURE EVALUATION
PRE-OP EVALUATION    Patient Name: Lillie Miller    Admit Diagnosis: Molar pregnancy (HCC) [O02.0]    Pre-op Diagnosis: Molar pregnancy (HCC) [O02.0]    DILATION AND CURETTAGE SUCTION    Anesthesia Procedure: DILATION AND CURETTAGE SUCTION (Uterus)    Surgeons and Role:     * Bridgette Gauthier MD - Primary    Pre-op vitals reviewed.  Temp: 97.6 °F (36.4 °C)  Pulse: 74  Resp: 16  BP: 110/58  SpO2: 100 %  Body mass index is 22.14 kg/m².    Current medications reviewed.  Hospital Medications:   [Transfer Hold] acetaminophen (Tylenol Extra Strength) tab 1,000 mg  1,000 mg Oral Once    [Transfer Hold] scopolamine (Transderm-Scop) 1 MG/3DAYS patch 1 patch  1 patch Transdermal Once    lactated ringers infusion   Intravenous Continuous    [COMPLETED] doxycycline (Vibramycin) cap 200 mg  200 mg Oral Once       Outpatient Medications:     No medications prior to admission.       Allergies: Patient has no known allergies.      Anesthesia Evaluation    Patient summary reviewed.    Anesthetic Complications  (-) history of anesthetic complications         GI/Hepatic/Renal    Negative GI/hepatic/renal ROS.                             Cardiovascular    Negative cardiovascular ROS.    Exercise tolerance: good     MET: >4                                           Endo/Other    Negative endo/other ROS.                              Pulmonary    Negative pulmonary ROS.                       Neuro/Psych    Negative neuro/psych ROS.                                  Past Surgical History:   Procedure Laterality Date    Lasik       Social History     Socioeconomic History    Marital status:    Tobacco Use    Smoking status: Never    Smokeless tobacco: Never   Vaping Use    Vaping status: Never Used   Substance and Sexual Activity    Alcohol use: Not Currently    Drug use: Never    Sexual activity: Not Currently   Other Topics Concern    Caffeine Concern No    Exercise No    Seat Belt Yes     History   Drug Use Unknown     Available  pre-op labs reviewed.  Lab Results   Component Value Date    WBC 6.0 05/11/2024    RBC 4.17 05/11/2024    HGB 12.9 05/11/2024    HCT 38.5 05/11/2024    MCV 92.3 05/11/2024    MCH 30.9 05/11/2024    MCHC 33.5 05/11/2024    RDW 12.0 05/11/2024    .0 05/11/2024               Airway      Mallampati: II  Mouth opening: >3 FB  TM distance: > 6 cm  Neck ROM: full Cardiovascular    Cardiovascular exam normal.  Rhythm: regular  Rate: normal     Dental    Dentition appears grossly intact         Pulmonary    Pulmonary exam normal.  Breath sounds clear to auscultation bilaterally.               Other findings              ASA: 2   Plan: MAC  NPO status verified and patient meets guidelines.  Patient has not taken beta blockers in last 24 hours.  Post-procedure pain management plan discussed with surgeon and patient.      Plan/risks discussed with: patient and spouse                Present on Admission:   Molar pregnancy (HCC)

## 2024-05-23 ENCOUNTER — TELEPHONE (OUTPATIENT)
Dept: OBGYN UNIT | Facility: HOSPITAL | Age: 44
End: 2024-05-23

## 2024-05-29 ENCOUNTER — LAB ENCOUNTER (OUTPATIENT)
Dept: LAB | Age: 44
End: 2024-05-29
Attending: OBSTETRICS & GYNECOLOGY
Payer: COMMERCIAL

## 2024-05-29 DIAGNOSIS — O02.0 MOLAR PREGNANCY (HCC): ICD-10-CM

## 2024-05-29 DIAGNOSIS — Z98.890 S/P D&C (STATUS POST DILATION AND CURETTAGE): ICD-10-CM

## 2024-05-29 DIAGNOSIS — O02.0 MOLAR PREGNANCY (HCC): Primary | ICD-10-CM

## 2024-05-29 LAB — B-HCG SERPL-ACNC: ABNORMAL MIU/ML

## 2024-05-29 PROCEDURE — 84702 CHORIONIC GONADOTROPIN TEST: CPT

## 2024-05-29 PROCEDURE — 36415 COLL VENOUS BLD VENIPUNCTURE: CPT

## 2024-05-30 LAB
CELLS ANALYZED CHRPOC: 20
CELLS COUNTED CHRPOC: 20
CELLS KARYOTYPED CHRPOC: 2
GTG BAND RES ACHIEVED CHRPOC: 400

## 2024-05-30 NOTE — PROGRESS NOTES
Patient notified.  Molar pregnancy.  Needs reliable contraception/abstinence and weekly HCG until zero.

## 2024-06-03 ENCOUNTER — OFFICE VISIT (OUTPATIENT)
Dept: OBGYN CLINIC | Facility: CLINIC | Age: 44
End: 2024-06-03
Payer: COMMERCIAL

## 2024-06-03 VITALS
HEIGHT: 63 IN | HEART RATE: 84 BPM | DIASTOLIC BLOOD PRESSURE: 72 MMHG | SYSTOLIC BLOOD PRESSURE: 110 MMHG | BODY MASS INDEX: 22.17 KG/M2 | WEIGHT: 125.13 LBS

## 2024-06-03 DIAGNOSIS — O02.0 MOLAR PREGNANCY (HCC): Primary | ICD-10-CM

## 2024-06-03 PROCEDURE — 99024 POSTOP FOLLOW-UP VISIT: CPT | Performed by: OBSTETRICS & GYNECOLOGY

## 2024-06-03 RX ORDER — ACETAMINOPHEN AND CODEINE PHOSPHATE 120; 12 MG/5ML; MG/5ML
0.35 SOLUTION ORAL DAILY
Qty: 84 TABLET | Refills: 3 | Status: SHIPPED | OUTPATIENT
Start: 2024-06-03 | End: 2025-06-03

## 2024-06-03 NOTE — PROGRESS NOTES
[facility]  Obstetrics and Gynecology  Follow Up Progress Note    Subjective:     Lillie Miller is a 43 year old  female who is s/p suction dilation and curettage on 24 2/2 molar pregnancy by Dr. Will. The patient was recommended to return for follow up. The patient reports doing well. She had no bleeding for 2-3 days after surgery then menses-like bleeding for a few days but now no bleeding for over 1 day.     Review of Systems:  General:  denies fevers, chills, fatigue and malaise.   Respiratory:  denies SOB, dyspnea, cough or wheezing  Cardiovascular:  denies chest pain, palpitations, exercise intolerance   GI: denies abdominal pain, diarrhea, constipation  : denies dysuria, hematuria, increased urinary frequency.       Objective:     Vitals:    24 1047   BP: 110/72   Pulse: 84   Weight: 125 lb 2 oz (56.8 kg)   Height: 63\"         Body mass index is 22.16 kg/m².    General: AAO.NAD.   CVS exam: normal peripheral perfusion   Chest: no tachypnea, retractions or cyanosis   Abdominal exam: soft, nontender, nondistended  Pelvic exam:   VULVA: normal appearing vulva with no masses, tenderness or lesions  PERINEUM:  normal appearing, no lesions   URETHRAL MEATUS:  normal appearing, no lesions   VAGINA: normal appearing vagina with normal color and discharge, no lesions  CERVIX: normal appearing cervix without discharge or lesions  UTERUS: uterus is normal size, shape, consistency and nontender  ADNEXA: normal adnexa in size, nontender and no masses  PERIRECTAL: normal appearing, no lesions   Ext: non-tender, no edema     Pathology   Final Diagnosis:   Products of conception, removal:  -Changes compatible with complete hydatidiform mole.  -See comment.   Electronically signed by Melissa De León MD on 2024 at 10:54 AM          Assessment:     Lillie Miller is a 43 year old  female who is s/p  suction dilation and curettage on 24 2/2 molar pregnancy who presents for follow up      Patient Active Problem List   Diagnosis    Multigravida of advanced maternal age (HCC)    History of postpartum depression    Molar pregnancy (HCC)         Plan:     Post operative exam   - doing well post operatively   - physical exam within normal limits   - may return to normal activity   - reviewed and d/w patient pathology results  Complete molar pregnancy   -Recommend serial hCG monitoring: weekly HCG until negative then monthly for 3 months   -Discussed with patient importance of contraception and further monitoring for complete molar pregnancy following surgical treatment  -New prescription provided for Micronor and recommend condom use  -Patient reports partner might complete vasectomy  -Discussed with patient risk of gestational trophoblastic disease/neoplasia and importance of close monitoring        All of the findings and plan were discussed with the patient.  She notes understanding and agrees with the plan of care.  All questions were answered to the best of my ability at this time.    Total patient time was 30 minutes in evaluation, consultation, and coordination of care. This included face to face and non-face to face actions. The patient's questions and concerns were addressed.     RTC in 6 months for well woman exam or sooner if needed     Chari Almanzar MD   [facility] - OBGYN     Discussed with patient that there will not be further notification of normal or benign results other than receiving results on [employer]. A [employer] message or telephone call will be placed by the physician and/or office staff if results are abnormal.     Note to patient and family   The 21st Century Cures Act makes medical notes available to patients in the interest of transparency.  However, please be advised that this is a medical document.  It is intended as [id number]-qw-fjux communication.  It is written and medical language may contain abbreviations or verbiage that are technical and unfamiliar.  It may appear blunt or  direct.  Medical documents are intended to carry relevant information, facts as evident, and the clinical opinion of the practitioner.        This note could include assistance by Dragon voice recognition. Errors in content may be related to improper recognition by the system; efforts to review and correct have been done but errors may still exist.

## 2024-06-04 ENCOUNTER — LAB ENCOUNTER (OUTPATIENT)
Dept: LAB | Age: 44
End: 2024-06-04
Attending: OBSTETRICS & GYNECOLOGY
Payer: COMMERCIAL

## 2024-06-04 DIAGNOSIS — O02.0 MOLAR PREGNANCY (HCC): ICD-10-CM

## 2024-06-04 DIAGNOSIS — Z98.890 S/P D&C (STATUS POST DILATION AND CURETTAGE): ICD-10-CM

## 2024-06-04 LAB
B-HCG SERPL-ACNC: 2478 MIU/ML
ERYTHROCYTE [DISTWIDTH] IN BLOOD BY AUTOMATED COUNT: 11.7 %
HCT VFR BLD AUTO: 34.1 %
HGB BLD-MCNC: 11.8 G/DL
MCH RBC QN AUTO: 31.1 PG (ref 26–34)
MCHC RBC AUTO-ENTMCNC: 34.6 G/DL (ref 31–37)
MCV RBC AUTO: 90 FL
PLATELET # BLD AUTO: 294 10(3)UL (ref 150–450)
RBC # BLD AUTO: 3.79 X10(6)UL
WBC # BLD AUTO: 6.5 X10(3) UL (ref 4–11)

## 2024-06-04 PROCEDURE — 84702 CHORIONIC GONADOTROPIN TEST: CPT

## 2024-06-04 PROCEDURE — 36415 COLL VENOUS BLD VENIPUNCTURE: CPT

## 2024-06-04 PROCEDURE — 85027 COMPLETE CBC AUTOMATED: CPT

## 2024-06-05 PROBLEM — O02.0: Status: ACTIVE | Noted: 2024-05-22

## 2024-06-10 NOTE — PROGRESS NOTES
Called patient to follow up. No answer.   Voicemail left to call the office back.   Phone number provided.

## 2024-06-20 ENCOUNTER — LAB ENCOUNTER (OUTPATIENT)
Dept: LAB | Age: 44
End: 2024-06-20
Attending: OBSTETRICS & GYNECOLOGY

## 2024-06-20 DIAGNOSIS — Z98.890 S/P D&C (STATUS POST DILATION AND CURETTAGE): ICD-10-CM

## 2024-06-20 DIAGNOSIS — O02.0 MOLAR PREGNANCY (HCC): ICD-10-CM

## 2024-06-20 LAB — B-HCG SERPL-ACNC: 54 MIU/ML

## 2024-06-20 PROCEDURE — 84702 CHORIONIC GONADOTROPIN TEST: CPT

## 2024-06-20 PROCEDURE — 36415 COLL VENOUS BLD VENIPUNCTURE: CPT

## 2024-06-27 ENCOUNTER — LAB ENCOUNTER (OUTPATIENT)
Dept: LAB | Age: 44
End: 2024-06-27
Attending: OBSTETRICS & GYNECOLOGY
Payer: COMMERCIAL

## 2024-06-27 LAB — B-HCG SERPL-ACNC: 21 MIU/ML

## 2024-07-03 ENCOUNTER — LAB ENCOUNTER (OUTPATIENT)
Dept: LAB | Age: 44
End: 2024-07-03
Attending: OBSTETRICS & GYNECOLOGY
Payer: COMMERCIAL

## 2024-07-03 DIAGNOSIS — Z98.890 S/P D&C (STATUS POST DILATION AND CURETTAGE): ICD-10-CM

## 2024-07-03 DIAGNOSIS — O02.0 MOLAR PREGNANCY (HCC): ICD-10-CM

## 2024-07-03 LAB — B-HCG SERPL-ACNC: 11 MIU/ML

## 2024-07-03 PROCEDURE — 84702 CHORIONIC GONADOTROPIN TEST: CPT

## 2024-07-03 PROCEDURE — 36415 COLL VENOUS BLD VENIPUNCTURE: CPT

## 2024-07-05 NOTE — PROGRESS NOTES
Patient was notified of results and recommendations by provider via [employer] message.  Written by Bridgette Gauthier MD on 7/5/2024  7:37 AM CDT  Seen by patient Lillie Miller on 7/5/2024  8:30 AM

## 2024-07-05 NOTE — PROGRESS NOTES
Appropriate decrease in quant hcg. Continue weekly quant hcg until negative, then monthly for 3 months.

## 2024-07-10 ENCOUNTER — LAB ENCOUNTER (OUTPATIENT)
Dept: LAB | Age: 44
End: 2024-07-10
Attending: OBSTETRICS & GYNECOLOGY
Payer: COMMERCIAL

## 2024-07-10 DIAGNOSIS — O02.0 MOLAR PREGNANCY (HCC): ICD-10-CM

## 2024-07-10 LAB — B-HCG SERPL-ACNC: 6 MIU/ML

## 2024-07-10 PROCEDURE — 36415 COLL VENOUS BLD VENIPUNCTURE: CPT

## 2024-07-10 PROCEDURE — 84702 CHORIONIC GONADOTROPIN TEST: CPT

## 2024-07-23 ENCOUNTER — LAB ENCOUNTER (OUTPATIENT)
Dept: LAB | Age: 44
End: 2024-07-23
Attending: OBSTETRICS & GYNECOLOGY
Payer: COMMERCIAL

## 2024-07-23 DIAGNOSIS — O02.0 MOLAR PREGNANCY (HCC): ICD-10-CM

## 2024-07-23 LAB — B-HCG SERPL-ACNC: <2.6 MIU/ML

## 2024-07-23 PROCEDURE — 84702 CHORIONIC GONADOTROPIN TEST: CPT

## 2024-07-23 PROCEDURE — 36415 COLL VENOUS BLD VENIPUNCTURE: CPT

## 2025-07-24 ENCOUNTER — OFFICE VISIT (OUTPATIENT)
Dept: OBGYN CLINIC | Facility: CLINIC | Age: 45
End: 2025-07-24
Payer: COMMERCIAL

## 2025-07-24 VITALS
SYSTOLIC BLOOD PRESSURE: 108 MMHG | BODY MASS INDEX: 21 KG/M2 | WEIGHT: 116.81 LBS | HEART RATE: 81 BPM | DIASTOLIC BLOOD PRESSURE: 70 MMHG

## 2025-07-24 DIAGNOSIS — Z01.419 ENCOUNTER FOR WELL WOMAN EXAM WITH ROUTINE GYNECOLOGICAL EXAM: Primary | ICD-10-CM

## 2025-07-24 DIAGNOSIS — Z12.4 SCREENING FOR CERVICAL CANCER: ICD-10-CM

## 2025-07-24 DIAGNOSIS — Z12.31 ENCOUNTER FOR SCREENING MAMMOGRAM FOR MALIGNANT NEOPLASM OF BREAST: ICD-10-CM

## 2025-07-24 PROCEDURE — 99459 PELVIC EXAMINATION: CPT | Performed by: OBSTETRICS & GYNECOLOGY

## 2025-07-24 PROCEDURE — 88175 CYTOPATH C/V AUTO FLUID REDO: CPT | Performed by: OBSTETRICS & GYNECOLOGY

## 2025-07-24 PROCEDURE — 87624 HPV HI-RISK TYP POOLED RSLT: CPT | Performed by: OBSTETRICS & GYNECOLOGY

## 2025-07-24 PROCEDURE — 99396 PREV VISIT EST AGE 40-64: CPT | Performed by: OBSTETRICS & GYNECOLOGY

## 2025-07-24 NOTE — PROGRESS NOTES
[facility]  Obstetrics and Gynecology    Subjective:     Lillie Miller is a 44 year old  who presents for an annual gyn exam. Patient complaints include shorter cycles, also with a dried small amount of discharge 2-3 days before period. Molar pregnancy last summer - followed hcg to <3 but then stopped monitoring. Has had regular cycles since and using condoms for contraception.    Patient's last menstrual period was 2025 (exact date).   Menarche: 15 (2025 12:08 PM)  Period Cycle (Days): 28-30 days (2025 12:08 PM)  Period Duration (Days): 5-6 days (2025 12:08 PM)  Period Flow: Moderate (2025 12:08 PM)  Use of Birth Control (if yes, specify type): Condoms (2025 12:08 PM)  Hx Prior Abnormal Pap: No (2025 12:08 PM)     Dyspareunia: occasional.    Difficulty with bowel or bladder function: No.   History of STDs: No.  Smoker: No.  Safe at home: Yes.  , 4 children.    Screening:  Pap smear: neg 2019  Mammogram: not yet done  Colonoscopy: n/a - No recommendations at this time   DEXA: n/a No recommendations at this time           Review of Systems  Constitutional: Denies fever/chills, weight loss, fatigue, weakness, or sweating  HEENT: Denies headache, hearing loss or tinnitus, ear pain or discharge, nosebleeds, congestion, sore throat  Eye: Denies visual changes, eye pain or discharge or redness  Cardiovascular: Denies chest pain, palpitations  Pulmonary: Denies cough, shortness of breath, wheezing  Breast: denies breast pain or palpable mass, skin changes, nipple discharge  GI: Denies nausea, vomiting, diarrhea, constipation, heartburn, hemachezia  : Denies dysuria, urgency, frequency, hematuria, flank pain  Musculoskeletal: Denies myalgias, pain in neck or back or joints  Skin: Denies rash, itching  Endocrine: Denies easy bruising or bleeding, increased thirst  Neuro: Denies dizziness, paraesthesias, focal weakness, seizures, loss of consciousness  Psych: Denies  depression, anxiety, suicidal ideations, hallucinations, insomnia     Past Medical History   Past Medical History[1]      Past Obstetric History   OB History    Para Term  AB Living   6 4 4 0 2 4   SAB IAB Ectopic Multiple Live Births   1 0 0 0 4      # Outcome Date GA Lbr Norm/2nd Weight Sex Type Anes PTL Lv   6 Molar 24           5 Term 21 37w1d 15:17 / 00:22 6 lb 10.4 oz (3.015 kg) M Vag-Spont EPI N CINDI   4 Term 19 39w1d 03:29 / 00:09 7 lb 6.9 oz (3.37 kg) F Vag-Spont EPI  CINDI   3 Term 17 39w0d 00:57 / 00:01 7 lb 10.6 oz (3.475 kg) F Vag-Spont None  CINDI      Birth Comments: Precipitous, delivered within minutes of arrival to [facility]   2 Term 14 37w1d  7 lb 7 oz (3.374 kg) M  EPI  CINDI   1 SAB 13 5w0d             Birth Comments: no medication or surgey.       Past Surgical History   Past Surgical History[2]     Family History   Family History[3]     Social History   Short Social Hx on File[4]     Medications   Medications Ordered Prior to Encounter[5]    Allergies   Allergies[6]       Objective:     Vitals:    25 1214   BP: 108/70   Pulse: 81   Weight: 116 lb 12.8 oz (53 kg)       Body mass index is 20.69 kg/m².    GEN: AAOx3, NAD, appears well, appears stated age  HEENT: normocephalic, atraumatic, thyroid symmetric without enlargement or nodularity  RESP: breathing comfortably on room air  BREAST: bilaterally symmetric with no palpable masses, no nipple discharge, no skin changes  ABD: soft, NT, ND, no rebound or guarding  EXT: no c/c/e or tenderness  NEURO: CN 2-12 grossly intact  SKIN: no lesions of note  PELVIC:   Vulva: NEFG.   Vagina: Estrogenized, physiologic discharge.    Cervix: Large. No lesions or tenderness.     Uterus: anteverted, 6 week size, firm, mobile, nontender.     Adnexa: No masses or tenderness.     Rectal: Anus and perineum are normal.    Chaperone offered and declined.     Assessment:     Lillie Miller is a 44 year old  seen for  well-women gyn exam.    Plan:     -- cervical cancer screening: Pap with HPV co-testing done today.  -- breast cancer screening: continue annual CBE, start annual mammograms now that she is >41yo, ordered  -- STD screening ordered: No  -- Contraception: condoms  -- Discussed further preventative care with PCP, staying up to date with screening and vaccinations, and maintaining healthy diet and exercise.      -- Follow up in 1 year for annual exam or sooner as needed    Bridgette Will MD  [facility]  7/24/2025 12:24 PM           [1] History reviewed. No pertinent past medical history.  [2]   Past Surgical History:  Procedure Laterality Date    D & c  05/22/2024    Lasik     [3] History reviewed. No pertinent family history.  [4]   Social History  Socioeconomic History    Marital status:    Tobacco Use    Smoking status: Never    Smokeless tobacco: Never   Vaping Use    Vaping status: Never Used   Substance and Sexual Activity    Alcohol use: Yes     Comment: socially    Drug use: Never    Sexual activity: Yes     Partners: Male     Birth control/protection: Condom   Other Topics Concern    Caffeine Concern No    Exercise Yes    Seat Belt Yes     Social Drivers of Health     Food Insecurity: No Food Insecurity (7/24/2025)    NCSS - Food Insecurity     Worried About Running Out of Food in the Last Year: No     Ran Out of Food in the Last Year: No   Transportation Needs: No Transportation Needs (7/24/2025)    NCSS - Transportation     Lack of Transportation: No   Housing Stability: Not At Risk (7/24/2025)    NCSS - Housing/Utilities     Has Housing: Yes     Worried About Losing Housing: No     Unable to Get Utilities: No   [5]   Current Outpatient Medications on File Prior to Visit   Medication Sig Dispense Refill    Norethindrone (ORTHO MICRONOR) 0.35 MG Oral Tab Take 1 tablet (0.35 mg total) by mouth daily. 84 tablet 3    ondansetron 4 MG Oral Tablet Dispersible Take 1 tablet (4 mg total) by mouth every 4  (four) hours as needed for Nausea. 20 tablet 0     No current facility-administered medications on file prior to visit.   [6] No Known Allergies

## 2025-07-25 LAB — HPV E6+E7 MRNA CVX QL NAA+PROBE: NEGATIVE

## (undated) DEVICE — SET COLL TBNG 0.5INX6FT INTEGR SWVL HNDL

## (undated) DEVICE — SYSTEM COLL W/ TISS TRAP INCLUDE COLL CANSTR

## (undated) DEVICE — SUT CHRM GUT 3-0 27IN SH ABSRB UD 26MM 1/2

## (undated) DEVICE — SOLUTION IRRIG 1000ML 0.9% NACL USP BTL

## (undated) DEVICE — PACK GYNE CUSTOM

## (undated) DEVICE — GLOVE SUR 6.5 SENSICARE PI PIP GRN PWD F

## (undated) DEVICE — GLOVE SUR 6.5 SENSICARE PI PIP CRM PWD F

## (undated) DEVICE — GLOVE SUR 6 SENSICARE PI PIP CRM PWD F

## (undated) DEVICE — PREMIUM WET SKIN PREP TRAY: Brand: MEDLINE INDUSTRIES, INC.

## (undated) DEVICE — SLEEVE COMPR MD KNEE LEN SGL USE KENDALL SCD